# Patient Record
Sex: MALE | Race: WHITE | NOT HISPANIC OR LATINO | Employment: FULL TIME | URBAN - METROPOLITAN AREA
[De-identification: names, ages, dates, MRNs, and addresses within clinical notes are randomized per-mention and may not be internally consistent; named-entity substitution may affect disease eponyms.]

---

## 2019-12-16 ENCOUNTER — OFFICE VISIT (OUTPATIENT)
Dept: FAMILY MEDICINE | Facility: CLINIC | Age: 29
End: 2019-12-16
Payer: COMMERCIAL

## 2019-12-16 VITALS
BODY MASS INDEX: 37.24 KG/M2 | TEMPERATURE: 99 F | HEIGHT: 70 IN | SYSTOLIC BLOOD PRESSURE: 148 MMHG | WEIGHT: 260.13 LBS | HEART RATE: 86 BPM | DIASTOLIC BLOOD PRESSURE: 80 MMHG

## 2019-12-16 DIAGNOSIS — E78.5 DYSLIPIDEMIA: ICD-10-CM

## 2019-12-16 DIAGNOSIS — Z23 IMMUNIZATION DUE: ICD-10-CM

## 2019-12-16 DIAGNOSIS — Z00.00 GENERAL MEDICAL EXAM: ICD-10-CM

## 2019-12-16 DIAGNOSIS — R10.11 RUQ PAIN: ICD-10-CM

## 2019-12-16 DIAGNOSIS — G62.9 POLYNEUROPATHY: Primary | ICD-10-CM

## 2019-12-16 DIAGNOSIS — R94.31 ABNORMAL ECG: ICD-10-CM

## 2019-12-16 DIAGNOSIS — R07.89 CHEST DISCOMFORT: ICD-10-CM

## 2019-12-16 PROCEDURE — 99999 PR PBB SHADOW E&M-EST. PATIENT-LVL III: ICD-10-PCS | Mod: PBBFAC,,, | Performed by: FAMILY MEDICINE

## 2019-12-16 PROCEDURE — 93010 ELECTROCARDIOGRAM REPORT: CPT | Mod: S$GLB,,, | Performed by: INTERNAL MEDICINE

## 2019-12-16 PROCEDURE — 90715 TDAP VACCINE GREATER THAN OR EQUAL TO 7YO IM: ICD-10-PCS | Mod: S$GLB,,, | Performed by: FAMILY MEDICINE

## 2019-12-16 PROCEDURE — 90472 TDAP VACCINE GREATER THAN OR EQUAL TO 7YO IM: ICD-10-PCS | Mod: S$GLB,,, | Performed by: FAMILY MEDICINE

## 2019-12-16 PROCEDURE — 99999 PR PBB SHADOW E&M-EST. PATIENT-LVL III: CPT | Mod: PBBFAC,,, | Performed by: FAMILY MEDICINE

## 2019-12-16 PROCEDURE — 90715 TDAP VACCINE 7 YRS/> IM: CPT | Mod: S$GLB,,, | Performed by: FAMILY MEDICINE

## 2019-12-16 PROCEDURE — 90686 FLU VACCINE (QUAD) GREATER THAN OR EQUAL TO 3YO PRESERVATIVE FREE IM: ICD-10-PCS | Mod: S$GLB,,, | Performed by: FAMILY MEDICINE

## 2019-12-16 PROCEDURE — 90471 FLU VACCINE (QUAD) GREATER THAN OR EQUAL TO 3YO PRESERVATIVE FREE IM: ICD-10-PCS | Mod: S$GLB,,, | Performed by: FAMILY MEDICINE

## 2019-12-16 PROCEDURE — 90686 IIV4 VACC NO PRSV 0.5 ML IM: CPT | Mod: S$GLB,,, | Performed by: FAMILY MEDICINE

## 2019-12-16 PROCEDURE — 90472 IMMUNIZATION ADMIN EACH ADD: CPT | Mod: S$GLB,,, | Performed by: FAMILY MEDICINE

## 2019-12-16 PROCEDURE — 93005 ELECTROCARDIOGRAM TRACING: CPT | Mod: S$GLB,,, | Performed by: FAMILY MEDICINE

## 2019-12-16 PROCEDURE — 93005 EKG 12-LEAD: ICD-10-PCS | Mod: S$GLB,,, | Performed by: FAMILY MEDICINE

## 2019-12-16 PROCEDURE — 93010 EKG 12-LEAD: ICD-10-PCS | Mod: S$GLB,,, | Performed by: INTERNAL MEDICINE

## 2019-12-16 PROCEDURE — 99204 OFFICE O/P NEW MOD 45 MIN: CPT | Mod: 25,S$GLB,, | Performed by: FAMILY MEDICINE

## 2019-12-16 PROCEDURE — 90471 IMMUNIZATION ADMIN: CPT | Mod: S$GLB,,, | Performed by: FAMILY MEDICINE

## 2019-12-16 PROCEDURE — 99204 PR OFFICE/OUTPT VISIT, NEW, LEVL IV, 45-59 MIN: ICD-10-PCS | Mod: 25,S$GLB,, | Performed by: FAMILY MEDICINE

## 2019-12-16 RX ORDER — PANTOPRAZOLE SODIUM 40 MG/1
40 TABLET, DELAYED RELEASE ORAL DAILY
Qty: 30 TABLET | Refills: 11 | Status: SHIPPED | OUTPATIENT
Start: 2019-12-16 | End: 2019-12-19 | Stop reason: SDUPTHER

## 2019-12-16 NOTE — PROGRESS NOTES
THIS DOCUMENT WAS MADE IN PART WITH VOICE RECOGNITION SOFTWARE.  OCCASIONALLY THIS SOFTWARE WILL MISINTERPRET WORDS OR PHRASES.    Assessment and Plan:    1. Polyneuropathy  Will order broad lab work to try and scar in on cause of neuropathy  - Hemoglobin A1c; Future  - TSH; Future  - T4, free; Future  - Magnesium; Future  - Vitamin B12; Future  - Sedimentation rate; Future  - C-reactive protein; Future  - MAX Screen w/Reflex; Future    2. General medical exam  - CBC auto differential; Future  - Comprehensive metabolic panel; Future    3. Dyslipidemia  - Lipid panel; Future    4. Immunization due  Influenza and tetanus vaccine today  - (In Office Administered) Tdap Vaccine    5. RUQ pain  Check ultrasound, H pylori to evaluate for cause, will try proton pump inhibitor in the meantime for possible peptic ulcer disease versus gastritis  - H.Pylori Antibody IgG; Future  - pantoprazole (PROTONIX) 40 MG tablet; Take 1 tablet (40 mg total) by mouth once daily.  Dispense: 30 tablet; Refill: 11  - US Abdomen Limited; Future    6. Chest discomfort  EKG suggestive of possible history of infarct, low suspicion but will order echocardiogram to rule out  - EKG 12-lead-possible inferior infarct per computer read, patient does have flipped T-waves in lead 3 and AVF.  - Stress Echo Which stress agent will be used? Treadmill Exercise; Color Flow Doppler? No; Future    7. Abnormal ECG  - Stress Echo Which stress agent will be used? Treadmill Exercise; Color Flow Doppler? No; Future    8.  History of alcoholism  Patient is sober for 6 months, will let me know if he needs medical assistance    ______________________________________________________________________  Subjective:    Chief Complaint:  Chief Complaint   Patient presents with    Diabetes     pt is concerned he may be diabetic. pt c/o tingling in both hands and feet and dry mouth        HPI:  Michael is a 29 y.o. year old       H/o alcohol use, heavy  Quit about 6 months  ago.  Both he and his dad have history of heavy drinking.  Reports he was drinking approximately 1/2 pt liquor a night.  Has history of DUI.  Reports he is doing well in regards to cravings    RUQ pain   Duration 12 months.  Patient concerned about liver function due to his history of drinking.  Does not correlate food intake with pain. No change in color of stool.  Denies any fever unexplained weight loss.  Denies any hematemesis or hematuria.    Occasional chest discomfort  Can occur at rest or with physical activity.  Has been occurring for the last several weeks.  No family history of cardiovascular disease, no history of smoking.  Episodes last for several minutes at a time.    Neuropathy with dry mouth  Associated with increased thirst and polyuria.  Previous to concerned about possible diagnosis of diabetes.  He reports neuropathy episodes involved both hands and feet and only lasted for short period of time.  He reports no recent episodes.      Past Medical History:  Past Medical History:   Diagnosis Date    Asthma     childhood       Past Surgical History:  History reviewed. No pertinent surgical history.    Family History:  Family History   Problem Relation Age of Onset    No Known Problems Mother     No Known Problems Father     No Known Problems Brother        Social History:  Social History     Socioeconomic History    Marital status: Single     Spouse name: Not on file    Number of children: Not on file    Years of education: Not on file    Highest education level: Not on file   Occupational History    Not on file   Social Needs    Financial resource strain: Not on file    Food insecurity:     Worry: Not on file     Inability: Not on file    Transportation needs:     Medical: Not on file     Non-medical: Not on file   Tobacco Use    Smoking status: Never Smoker    Smokeless tobacco: Never Used   Substance and Sexual Activity    Alcohol use: Not Currently     Comment: heavy use for about  "4-5 (1/2 pint per day)    Drug use: No    Sexual activity: Yes     Partners: Female   Lifestyle    Physical activity:     Days per week: Not on file     Minutes per session: Not on file    Stress: Not on file   Relationships    Social connections:     Talks on phone: Not on file     Gets together: Not on file     Attends Muslim service: Not on file     Active member of club or organization: Not on file     Attends meetings of clubs or organizations: Not on file     Relationship status: Not on file   Other Topics Concern    Not on file   Social History Narrative    Not on file       Medications:  No current outpatient medications on file prior to visit.     No current facility-administered medications on file prior to visit.        Allergies:  Patient has no known allergies.    Immunizations:  Immunization History   Administered Date(s) Administered    Influenza - Quadrivalent - PF (6 months and older) 12/16/2019    Tdap 12/16/2019       Review of Systems:  Review of Systems   Constitutional: Negative for fever.   Respiratory: Negative for cough and shortness of breath.    Cardiovascular: Positive for chest pain.   Gastrointestinal: Positive for abdominal pain. Negative for diarrhea, nausea and vomiting.   Skin: Negative for rash.   Neurological: Positive for numbness.   Psychiatric/Behavioral: Negative for dysphoric mood.       Objective:    Vitals:  Vitals:    12/16/19 1007   BP: (!) 148/80   Pulse: 86   Temp: 98.7 °F (37.1 °C)   TempSrc: Oral   Weight: 118 kg (260 lb 2.3 oz)   Height: 5' 10" (1.778 m)   PainSc: 0-No pain       Physical Exam   Constitutional: He is oriented to person, place, and time. No distress.   HENT:   Head: Normocephalic and atraumatic.   Eyes: Pupils are equal, round, and reactive to light. EOM are normal.   Neck: Neck supple.   Cardiovascular: Normal rate and regular rhythm. Exam reveals no friction rub.   No murmur heard.  Pulmonary/Chest: Effort normal and breath sounds " normal.   Abdominal: Soft. Bowel sounds are normal. He exhibits no distension. There is no tenderness.   Neurological: He is alert and oriented to person, place, and time. He has normal strength. No cranial nerve deficit or sensory deficit. GCS eye subscore is 4. GCS verbal subscore is 5. GCS motor subscore is 6.   Reflex Scores:       Tricep reflexes are 2+ on the right side and 2+ on the left side.       Bicep reflexes are 2+ on the right side and 2+ on the left side.       Brachioradialis reflexes are 2+ on the right side and 2+ on the left side.       Patellar reflexes are 2+ on the right side and 2+ on the left side.       Achilles reflexes are 2+ on the right side and 2+ on the left side.  Skin: Skin is warm and dry. No rash noted.   Psychiatric: He has a normal mood and affect. His behavior is normal.       Data:  No previous labs, imaging, or notes available.        Kushal Porter MD  Family Medicine

## 2019-12-17 ENCOUNTER — LAB VISIT (OUTPATIENT)
Dept: LAB | Facility: HOSPITAL | Age: 29
End: 2019-12-17
Attending: FAMILY MEDICINE
Payer: COMMERCIAL

## 2019-12-17 ENCOUNTER — TELEPHONE (OUTPATIENT)
Dept: FAMILY MEDICINE | Facility: CLINIC | Age: 29
End: 2019-12-17

## 2019-12-17 DIAGNOSIS — G62.9 POLYNEUROPATHY: ICD-10-CM

## 2019-12-17 DIAGNOSIS — R10.11 RUQ PAIN: ICD-10-CM

## 2019-12-17 DIAGNOSIS — Z00.00 GENERAL MEDICAL EXAM: ICD-10-CM

## 2019-12-17 DIAGNOSIS — E78.5 DYSLIPIDEMIA: ICD-10-CM

## 2019-12-17 LAB
ALBUMIN SERPL BCP-MCNC: 4.5 G/DL (ref 3.5–5.2)
ALP SERPL-CCNC: 62 U/L (ref 55–135)
ALT SERPL W/O P-5'-P-CCNC: 31 U/L (ref 10–44)
ANION GAP SERPL CALC-SCNC: 8 MMOL/L (ref 8–16)
AST SERPL-CCNC: 21 U/L (ref 10–40)
BASOPHILS # BLD AUTO: 0.03 K/UL (ref 0–0.2)
BASOPHILS NFR BLD: 0.4 % (ref 0–1.9)
BILIRUB SERPL-MCNC: 0.7 MG/DL (ref 0.1–1)
BUN SERPL-MCNC: 12 MG/DL (ref 6–20)
CALCIUM SERPL-MCNC: 9.8 MG/DL (ref 8.7–10.5)
CHLORIDE SERPL-SCNC: 105 MMOL/L (ref 95–110)
CHOLEST SERPL-MCNC: 254 MG/DL (ref 120–199)
CHOLEST/HDLC SERPL: 5.5 {RATIO} (ref 2–5)
CO2 SERPL-SCNC: 26 MMOL/L (ref 23–29)
CREAT SERPL-MCNC: 0.9 MG/DL (ref 0.5–1.4)
CRP SERPL-MCNC: 6.8 MG/L (ref 0–8.2)
DIFFERENTIAL METHOD: ABNORMAL
EOSINOPHIL # BLD AUTO: 0.1 K/UL (ref 0–0.5)
EOSINOPHIL NFR BLD: 1.4 % (ref 0–8)
ERYTHROCYTE [DISTWIDTH] IN BLOOD BY AUTOMATED COUNT: 12.4 % (ref 11.5–14.5)
ERYTHROCYTE [SEDIMENTATION RATE] IN BLOOD BY WESTERGREN METHOD: 16 MM/HR (ref 0–23)
EST. GFR  (AFRICAN AMERICAN): >60 ML/MIN/1.73 M^2
EST. GFR  (NON AFRICAN AMERICAN): >60 ML/MIN/1.73 M^2
ESTIMATED AVG GLUCOSE: 94 MG/DL (ref 68–131)
GLUCOSE SERPL-MCNC: 97 MG/DL (ref 70–110)
HBA1C MFR BLD HPLC: 4.9 % (ref 4–5.6)
HCT VFR BLD AUTO: 40.2 % (ref 40–54)
HDLC SERPL-MCNC: 46 MG/DL (ref 40–75)
HDLC SERPL: 18.1 % (ref 20–50)
HGB BLD-MCNC: 13 G/DL (ref 14–18)
IMM GRANULOCYTES # BLD AUTO: 0.01 K/UL (ref 0–0.04)
IMM GRANULOCYTES NFR BLD AUTO: 0.1 % (ref 0–0.5)
LDLC SERPL CALC-MCNC: 182.4 MG/DL (ref 63–159)
LYMPHOCYTES # BLD AUTO: 1.1 K/UL (ref 1–4.8)
LYMPHOCYTES NFR BLD: 13.3 % (ref 18–48)
MAGNESIUM SERPL-MCNC: 2 MG/DL (ref 1.6–2.6)
MCH RBC QN AUTO: 29.8 PG (ref 27–31)
MCHC RBC AUTO-ENTMCNC: 32.3 G/DL (ref 32–36)
MCV RBC AUTO: 92 FL (ref 82–98)
MONOCYTES # BLD AUTO: 0.8 K/UL (ref 0.3–1)
MONOCYTES NFR BLD: 9.5 % (ref 4–15)
NEUTROPHILS # BLD AUTO: 6.3 K/UL (ref 1.8–7.7)
NEUTROPHILS NFR BLD: 75.3 % (ref 38–73)
NONHDLC SERPL-MCNC: 208 MG/DL
NRBC BLD-RTO: 0 /100 WBC
PLATELET # BLD AUTO: 259 K/UL (ref 150–350)
PMV BLD AUTO: 10.6 FL (ref 9.2–12.9)
POTASSIUM SERPL-SCNC: 4.2 MMOL/L (ref 3.5–5.1)
PROT SERPL-MCNC: 8.1 G/DL (ref 6–8.4)
RBC # BLD AUTO: 4.36 M/UL (ref 4.6–6.2)
SODIUM SERPL-SCNC: 139 MMOL/L (ref 136–145)
T4 FREE SERPL-MCNC: 1 NG/DL (ref 0.71–1.51)
TRIGL SERPL-MCNC: 128 MG/DL (ref 30–150)
TSH SERPL DL<=0.005 MIU/L-ACNC: 1.23 UIU/ML (ref 0.4–4)
VIT B12 SERPL-MCNC: 430 PG/ML (ref 210–950)
WBC # BLD AUTO: 8.32 K/UL (ref 3.9–12.7)

## 2019-12-17 PROCEDURE — 84443 ASSAY THYROID STIM HORMONE: CPT

## 2019-12-17 PROCEDURE — 84439 ASSAY OF FREE THYROXINE: CPT

## 2019-12-17 PROCEDURE — 83036 HEMOGLOBIN GLYCOSYLATED A1C: CPT

## 2019-12-17 PROCEDURE — 85652 RBC SED RATE AUTOMATED: CPT

## 2019-12-17 PROCEDURE — 80061 LIPID PANEL: CPT

## 2019-12-17 PROCEDURE — 86038 ANTINUCLEAR ANTIBODIES: CPT

## 2019-12-17 PROCEDURE — 36415 COLL VENOUS BLD VENIPUNCTURE: CPT | Mod: PN

## 2019-12-17 PROCEDURE — 85025 COMPLETE CBC W/AUTO DIFF WBC: CPT

## 2019-12-17 PROCEDURE — 86677 HELICOBACTER PYLORI ANTIBODY: CPT

## 2019-12-17 PROCEDURE — 82607 VITAMIN B-12: CPT

## 2019-12-17 PROCEDURE — 83735 ASSAY OF MAGNESIUM: CPT

## 2019-12-17 PROCEDURE — 80053 COMPREHEN METABOLIC PANEL: CPT

## 2019-12-17 PROCEDURE — 86140 C-REACTIVE PROTEIN: CPT

## 2019-12-17 NOTE — TELEPHONE ENCOUNTER
----- Message from Kirsten Stevenson sent at 12/17/2019 11:42 AM CST -----  Contact: Patient  Type: Needs Medical Advice    Who Called:  Patient  Best Call Back Number:   Additional Information: Calling to request the Nurse call his Insurance company at  in regards to pantoprazole (PROTONIX) 40 MG tablet.

## 2019-12-18 ENCOUNTER — HOSPITAL ENCOUNTER (OUTPATIENT)
Dept: RADIOLOGY | Facility: HOSPITAL | Age: 29
Discharge: HOME | End: 2019-12-18
Attending: FAMILY MEDICINE
Payer: COMMERCIAL

## 2019-12-18 DIAGNOSIS — R10.11 RUQ PAIN: ICD-10-CM

## 2019-12-18 LAB — H PYLORI IGG SERPL QL IA: NEGATIVE

## 2019-12-18 PROCEDURE — 76705 ECHO EXAM OF ABDOMEN: CPT | Mod: TC,PO

## 2019-12-18 PROCEDURE — 76705 US ABDOMEN LIMITED: ICD-10-PCS | Mod: 26,,, | Performed by: RADIOLOGY

## 2019-12-18 PROCEDURE — 76705 ECHO EXAM OF ABDOMEN: CPT | Mod: 26,,, | Performed by: RADIOLOGY

## 2019-12-19 DIAGNOSIS — R10.11 RUQ PAIN: ICD-10-CM

## 2019-12-19 LAB — ANA SER QL IF: NORMAL

## 2019-12-19 NOTE — TELEPHONE ENCOUNTER
----- Message from Damien Bhagat sent at 12/19/2019 10:49 AM CST -----  Contact: pt  Type:  Pharmacy Calling to Clarify an RX    Name of Caller:  pt  Pharmacy Name:    [facility]  Phone: [phone]    Prescription Name:  pantoprazole (PROTONIX) 40 MG tablet  What do they need to clarify?:  Sent to wrong pharmacy. (local)  Best Call Back Number:  [phone]  Additional Information:  Pt is requesting the Rx be sent to the above pharmacy.

## 2019-12-20 DIAGNOSIS — E78.5 DYSLIPIDEMIA: Primary | ICD-10-CM

## 2019-12-20 RX ORDER — ROSUVASTATIN CALCIUM 20 MG/1
20 TABLET, COATED ORAL DAILY
Qty: 90 TABLET | Refills: 3 | Status: SHIPPED | OUTPATIENT
Start: 2019-12-20 | End: 2020-11-09 | Stop reason: SDUPTHER

## 2019-12-20 RX ORDER — PANTOPRAZOLE SODIUM 40 MG/1
40 TABLET, DELAYED RELEASE ORAL DAILY
Qty: 90 TABLET | Refills: 3 | Status: SHIPPED | OUTPATIENT
Start: 2019-12-20 | End: 2020-11-09 | Stop reason: SDUPTHER

## 2020-01-02 ENCOUNTER — CLINICAL SUPPORT (OUTPATIENT)
Dept: CARDIOLOGY | Facility: CLINIC | Age: 30
End: 2020-01-02
Attending: FAMILY MEDICINE
Payer: COMMERCIAL

## 2020-01-02 ENCOUNTER — OFFICE VISIT (OUTPATIENT)
Dept: FAMILY MEDICINE | Facility: CLINIC | Age: 30
End: 2020-01-02
Payer: COMMERCIAL

## 2020-01-02 ENCOUNTER — TELEPHONE (OUTPATIENT)
Dept: FAMILY MEDICINE | Facility: CLINIC | Age: 30
End: 2020-01-02

## 2020-01-02 VITALS
SYSTOLIC BLOOD PRESSURE: 130 MMHG | HEART RATE: 97 BPM | WEIGHT: 257.69 LBS | DIASTOLIC BLOOD PRESSURE: 88 MMHG | BODY MASS INDEX: 36.89 KG/M2 | OXYGEN SATURATION: 97 % | HEIGHT: 70 IN | TEMPERATURE: 98 F

## 2020-01-02 VITALS — WEIGHT: 257 LBS | BODY MASS INDEX: 36.79 KG/M2 | HEIGHT: 70 IN

## 2020-01-02 DIAGNOSIS — R94.31 ABNORMAL ECG: ICD-10-CM

## 2020-01-02 DIAGNOSIS — E78.5 DYSLIPIDEMIA: Primary | ICD-10-CM

## 2020-01-02 DIAGNOSIS — R07.89 CHEST DISCOMFORT: ICD-10-CM

## 2020-01-02 DIAGNOSIS — R63.1 POLYDIPSIA: Primary | ICD-10-CM

## 2020-01-02 DIAGNOSIS — R10.11 RUQ PAIN: ICD-10-CM

## 2020-01-02 DIAGNOSIS — R63.1 POLYDIPSIA: ICD-10-CM

## 2020-01-02 LAB
ASCENDING AORTA: 2.47 CM
BSA FOR ECHO PROCEDURE: 2.4 M2
CV ECHO LV RWT: 0.44 CM
CV STRESS BASE HR: 93 BPM
DIASTOLIC BLOOD PRESSURE: 93 MMHG
DOP CALC LVOT AREA: 3.3 CM2
DOP CALC LVOT DIAMETER: 2.05 CM
DOP CALC LVOT PEAK VEL: 1.02 M/S
DOP CALC LVOT STROKE VOLUME: 59.32 CM3
DOP CALCLVOT PEAK VEL VTI: 17.98 CM
E WAVE DECELERATION TIME: 212.93 MSEC
E/A RATIO: 1.29
E/E' RATIO: 6.63 M/S
ECHO LV POSTERIOR WALL: 1.07 CM (ref 0.6–1.1)
FRACTIONAL SHORTENING: 35 % (ref 28–44)
INTERVENTRICULAR SEPTUM: 1.06 CM (ref 0.6–1.1)
IVRT: 0.1 MSEC
LA MAJOR: 4.59 CM
LA MINOR: 4.49 CM
LA WIDTH: 2.49 CM
LEFT ATRIUM SIZE: 3.56 CM
LEFT ATRIUM VOLUME INDEX: 14.7 ML/M2
LEFT ATRIUM VOLUME: 34.2 CM3
LEFT INTERNAL DIMENSION IN SYSTOLE: 3.17 CM (ref 2.1–4)
LEFT VENTRICLE DIASTOLIC VOLUME INDEX: 47.41 ML/M2
LEFT VENTRICLE DIASTOLIC VOLUME: 110.09 ML
LEFT VENTRICLE MASS INDEX: 81 G/M2
LEFT VENTRICLE SYSTOLIC VOLUME INDEX: 17.3 ML/M2
LEFT VENTRICLE SYSTOLIC VOLUME: 40.11 ML
LEFT VENTRICULAR INTERNAL DIMENSION IN DIASTOLE: 4.85 CM (ref 3.5–6)
LEFT VENTRICULAR MASS: 188.62 G
LV LATERAL E/E' RATIO: 5.73 M/S
LV SEPTAL E/E' RATIO: 7.88 M/S
MV PEAK A VEL: 0.49 M/S
MV PEAK E VEL: 0.63 M/S
OHS CV CPX 1 MINUTE RECOVERY HEART RATE: 144 BPM
OHS CV CPX 85 PERCENT MAX PREDICTED HEART RATE MALE: 161
OHS CV CPX ESTIMATED METS: 9
OHS CV CPX MAX PREDICTED HEART RATE: 189
OHS CV CPX PATIENT IS FEMALE: 0
OHS CV CPX PATIENT IS MALE: 1
OHS CV CPX PEAK DIASTOLIC BLOOD PRESSURE: 65 MMHG
OHS CV CPX PEAK HEAR RATE: 179 BPM
OHS CV CPX PEAK RATE PRESSURE PRODUCT: NORMAL
OHS CV CPX PEAK SYSTOLIC BLOOD PRESSURE: 196 MMHG
OHS CV CPX PERCENT MAX PREDICTED HEART RATE ACHIEVED: 95
OHS CV CPX RATE PRESSURE PRODUCT PRESENTING: NORMAL
PULM VEIN S/D RATIO: 1.19
PV PEAK D VEL: 0.67 M/S
PV PEAK S VEL: 0.8 M/S
RA MAJOR: 4.62 CM
RA PRESSURE: 3 MMHG
RA WIDTH: 2.48 CM
SINUS: 2.93 CM
STJ: 2.46 CM
STRESS ECHO POST EXERCISE DUR MIN: 5 MINUTES
STRESS ECHO POST EXERCISE DUR SEC: 8 SECONDS
SYSTOLIC BLOOD PRESSURE: 150 MMHG
TDI LATERAL: 0.11 M/S
TDI SEPTAL: 0.08 M/S
TDI: 0.1 M/S

## 2020-01-02 PROCEDURE — 3008F PR BODY MASS INDEX (BMI) DOCUMENTED: ICD-10-PCS | Mod: CPTII,S$GLB,, | Performed by: FAMILY MEDICINE

## 2020-01-02 PROCEDURE — 99999 PR PBB SHADOW E&M-EST. PATIENT-LVL I: ICD-10-PCS | Mod: PBBFAC,,,

## 2020-01-02 PROCEDURE — 93351 STRESS ECHO (CUPID ONLY): ICD-10-PCS | Mod: S$GLB,,, | Performed by: INTERNAL MEDICINE

## 2020-01-02 PROCEDURE — 99999 PR PBB SHADOW E&M-EST. PATIENT-LVL I: CPT | Mod: PBBFAC,,,

## 2020-01-02 PROCEDURE — 99999 PR PBB SHADOW E&M-EST. PATIENT-LVL III: ICD-10-PCS | Mod: PBBFAC,,, | Performed by: FAMILY MEDICINE

## 2020-01-02 PROCEDURE — 3008F BODY MASS INDEX DOCD: CPT | Mod: CPTII,S$GLB,, | Performed by: FAMILY MEDICINE

## 2020-01-02 PROCEDURE — 99214 PR OFFICE/OUTPT VISIT, EST, LEVL IV, 30-39 MIN: ICD-10-PCS | Mod: S$GLB,,, | Performed by: FAMILY MEDICINE

## 2020-01-02 PROCEDURE — 99214 OFFICE O/P EST MOD 30 MIN: CPT | Mod: S$GLB,,, | Performed by: FAMILY MEDICINE

## 2020-01-02 PROCEDURE — 93351 STRESS TTE COMPLETE: CPT | Mod: S$GLB,,, | Performed by: INTERNAL MEDICINE

## 2020-01-02 PROCEDURE — 99999 PR PBB SHADOW E&M-EST. PATIENT-LVL III: CPT | Mod: PBBFAC,,, | Performed by: FAMILY MEDICINE

## 2020-01-02 RX ORDER — DULOXETIN HYDROCHLORIDE 30 MG/1
CAPSULE, DELAYED RELEASE ORAL
COMMUNITY
Start: 2019-12-19

## 2020-01-02 NOTE — TELEPHONE ENCOUNTER
----- Message from Sheba Smiley sent at 1/2/2020  2:34 PM CST -----  Contact: Patient  Patient called to schedule appt from referral....was unable to schedule anything b/c it says [facility] has a clinic specifically for this issue?    Please either put in differently if he needs to see [facility] specifically or let patient know where to be seen?    Please call patient at [phone]

## 2020-01-02 NOTE — PROGRESS NOTES
THIS DOCUMENT WAS MADE IN PART WITH VOICE RECOGNITION SOFTWARE.  OCCASIONALLY THIS SOFTWARE WILL MISINTERPRET WORDS OR PHRASES.    Assessment and Plan:    1. Dyslipidemia  Continue Crestor, follow-up blood work in about 3 months    2. RUQ pain  Initiate pantoprazole, normal right upper quadrant ultrasound    3. Chest discomfort  Stress test today    4. Polydipsia  Normal A1c, referral to Endocrinology for possible diabetes insipidus workup versus other pathology  - Ambulatory referral/consult to Endocrinology; Future        ______________________________________________________________________  Subjective:    Chief Complaint:  Chief Complaint   Patient presents with    Polydipsia    Dyslipidemia    Chest Pain        HPI:  Michael is a 29 y.o. year old     Exertional chest discomfort  EKG with possible inferior infarct at previous visit.  We did order stress echo which is scheduled for today.  Has occasional chest discomfort.    Dyslipidemia  Patient had 182 LDL, 254 total cholesterol at previous lab check on December 17 2019.  Initiated Crestor 20 mg therapy, patient has yet to fill prescription.    Right upper quadrant discomfort  Negative H pylori.  Started pantoprazole at previous visit.  Ultrasound revealed no significant abnormality.  Has yet to initiate pantoprazole therapy due to a mixup with pharmacy.    Polydipsia  Patient still reports having extreme thirst.  Drinking approximately 2 gal of water daily.  A1c was 4.9%.  Normal sodium serum    Past Medical History:  Past Medical History:   Diagnosis Date    Asthma     childhood       Past Surgical History:  No past surgical history on file.    Family History:  Family History   Problem Relation Age of Onset    No Known Problems Mother     No Known Problems Father     No Known Problems Brother        Social History:  Social History     Socioeconomic History    Marital status: Single     Spouse name: Not on file    Number of children: Not on file     Years of education: Not on file    Highest education level: Not on file   Occupational History    Not on file   Social Needs    Financial resource strain: Not on file    Food insecurity:     Worry: Not on file     Inability: Not on file    Transportation needs:     Medical: Not on file     Non-medical: Not on file   Tobacco Use    Smoking status: Never Smoker    Smokeless tobacco: Never Used   Substance and Sexual Activity    Alcohol use: Not Currently     Comment: heavy use for about 4-5 (1/2 pint per day)    Drug use: No    Sexual activity: Yes     Partners: Female   Lifestyle    Physical activity:     Days per week: Not on file     Minutes per session: Not on file    Stress: Not on file   Relationships    Social connections:     Talks on phone: Not on file     Gets together: Not on file     Attends Mormonism service: Not on file     Active member of club or organization: Not on file     Attends meetings of clubs or organizations: Not on file     Relationship status: Not on file   Other Topics Concern    Not on file   Social History Narrative    Not on file       Medications:  Current Outpatient Medications on File Prior to Visit   Medication Sig Dispense Refill    DULoxetine (CYMBALTA) 30 MG capsule TK ONE C PO  QAM      pantoprazole (PROTONIX) 40 MG tablet Take 1 tablet (40 mg total) by mouth once daily. (Patient not taking: Reported on 1/2/2020) 90 tablet 3    rosuvastatin (CRESTOR) 20 MG tablet Take 1 tablet (20 mg total) by mouth once daily. (Patient not taking: Reported on 1/2/2020) 90 tablet 3     No current facility-administered medications on file prior to visit.        Allergies:  Patient has no known allergies.    Immunizations:  Immunization History   Administered Date(s) Administered    Influenza - Quadrivalent - PF (6 months and older) 12/16/2019    Tdap 12/16/2019       Review of Systems:  Review of Systems   Constitutional: Negative for fever.   Respiratory: Negative for cough  "and shortness of breath.    Cardiovascular: Negative for chest pain.   Gastrointestinal: Negative for abdominal pain, diarrhea, nausea and vomiting.   Endocrine: Positive for polydipsia.   Skin: Negative for rash.   Psychiatric/Behavioral: Negative for dysphoric mood.       Objective:    Vitals:  Vitals:    01/02/20 0931   BP: 130/88   Pulse: 97   Temp: 97.9 °F (36.6 °C)   TempSrc: Oral   SpO2: 97%   Weight: 116.9 kg (257 lb 11.5 oz)   Height: 5' 10" (1.778 m)   PainSc: 0-No pain       Physical Exam   Constitutional: No distress.   HENT:   Head: Normocephalic and atraumatic.   Eyes: Pupils are equal, round, and reactive to light. EOM are normal.   Neck: Neck supple.   Cardiovascular: Normal rate and regular rhythm. Exam reveals no friction rub.   No murmur heard.  Pulmonary/Chest: Effort normal and breath sounds normal.   Abdominal: Soft. Bowel sounds are normal. He exhibits no distension. There is no tenderness.   Skin: Skin is warm and dry. No rash noted.        Psychiatric: He has a normal mood and affect. His behavior is normal.       Data:  No previous labs, imaging, or notes available.        Kushal Porter MD  Family Medicine    "

## 2020-01-09 ENCOUNTER — OFFICE VISIT (OUTPATIENT)
Dept: ENDOCRINOLOGY | Facility: CLINIC | Age: 30
End: 2020-01-09
Payer: COMMERCIAL

## 2020-01-09 ENCOUNTER — LAB VISIT (OUTPATIENT)
Dept: LAB | Facility: HOSPITAL | Age: 30
End: 2020-01-09
Attending: INTERNAL MEDICINE
Payer: COMMERCIAL

## 2020-01-09 VITALS
HEIGHT: 70 IN | HEART RATE: 105 BPM | OXYGEN SATURATION: 98 % | DIASTOLIC BLOOD PRESSURE: 90 MMHG | BODY MASS INDEX: 37.5 KG/M2 | SYSTOLIC BLOOD PRESSURE: 142 MMHG | WEIGHT: 261.94 LBS

## 2020-01-09 DIAGNOSIS — R63.1 POLYDIPSIA: Primary | ICD-10-CM

## 2020-01-09 DIAGNOSIS — E66.9 CLASS 2 OBESITY WITH BODY MASS INDEX (BMI) OF 37.0 TO 37.9 IN ADULT, UNSPECIFIED OBESITY TYPE, UNSPECIFIED WHETHER SERIOUS COMORBIDITY PRESENT: ICD-10-CM

## 2020-01-09 DIAGNOSIS — E78.49 OTHER HYPERLIPIDEMIA: ICD-10-CM

## 2020-01-09 DIAGNOSIS — R35.89 POLYURIA: ICD-10-CM

## 2020-01-09 DIAGNOSIS — R63.1 POLYDIPSIA: ICD-10-CM

## 2020-01-09 LAB
ALBUMIN SERPL BCP-MCNC: 4.2 G/DL (ref 3.5–5.2)
ALP SERPL-CCNC: 55 U/L (ref 55–135)
ALT SERPL W/O P-5'-P-CCNC: 55 U/L (ref 10–44)
ANION GAP SERPL CALC-SCNC: 7 MMOL/L (ref 8–16)
AST SERPL-CCNC: 50 U/L (ref 10–40)
BILIRUB SERPL-MCNC: 0.5 MG/DL (ref 0.1–1)
BUN SERPL-MCNC: 9 MG/DL (ref 6–20)
CALCIUM SERPL-MCNC: 9.3 MG/DL (ref 8.7–10.5)
CHLORIDE SERPL-SCNC: 108 MMOL/L (ref 95–110)
CO2 SERPL-SCNC: 27 MMOL/L (ref 23–29)
CREAT SERPL-MCNC: 1.1 MG/DL (ref 0.5–1.4)
EST. GFR  (AFRICAN AMERICAN): >60 ML/MIN/1.73 M^2
EST. GFR  (NON AFRICAN AMERICAN): >60 ML/MIN/1.73 M^2
GLUCOSE SERPL-MCNC: 109 MG/DL (ref 70–110)
OSMOLALITY SERPL: 295 MOSM/KG (ref 280–300)
POTASSIUM SERPL-SCNC: 4.6 MMOL/L (ref 3.5–5.1)
PROT SERPL-MCNC: 7.4 G/DL (ref 6–8.4)
SODIUM SERPL-SCNC: 142 MMOL/L (ref 136–145)
URATE SERPL-MCNC: 6.9 MG/DL (ref 3.4–7)

## 2020-01-09 PROCEDURE — 80053 COMPREHEN METABOLIC PANEL: CPT

## 2020-01-09 PROCEDURE — 3008F PR BODY MASS INDEX (BMI) DOCUMENTED: ICD-10-PCS | Mod: CPTII,S$GLB,, | Performed by: INTERNAL MEDICINE

## 2020-01-09 PROCEDURE — 99204 OFFICE O/P NEW MOD 45 MIN: CPT | Mod: S$GLB,,, | Performed by: INTERNAL MEDICINE

## 2020-01-09 PROCEDURE — 3008F BODY MASS INDEX DOCD: CPT | Mod: CPTII,S$GLB,, | Performed by: INTERNAL MEDICINE

## 2020-01-09 PROCEDURE — 36415 COLL VENOUS BLD VENIPUNCTURE: CPT | Mod: PO

## 2020-01-09 PROCEDURE — 99999 PR PBB SHADOW E&M-EST. PATIENT-LVL III: ICD-10-PCS | Mod: PBBFAC,,, | Performed by: INTERNAL MEDICINE

## 2020-01-09 PROCEDURE — 99204 PR OFFICE/OUTPT VISIT, NEW, LEVL IV, 45-59 MIN: ICD-10-PCS | Mod: S$GLB,,, | Performed by: INTERNAL MEDICINE

## 2020-01-09 PROCEDURE — 83930 ASSAY OF BLOOD OSMOLALITY: CPT

## 2020-01-09 PROCEDURE — 99999 PR PBB SHADOW E&M-EST. PATIENT-LVL III: CPT | Mod: PBBFAC,,, | Performed by: INTERNAL MEDICINE

## 2020-01-09 PROCEDURE — 84550 ASSAY OF BLOOD/URIC ACID: CPT

## 2020-01-09 NOTE — PROGRESS NOTES
Subjective:    Patient ID:  Michael Barba is a 29 y.o. male.    Chief Complaint:  Polydipsia      Pt presents to establish care for polydipsia.    Notes this has been worsening over the last 6 months. Can drink up to 2 gallons of water daily. Notes increased thirst. Drinks Malinda (carbonated water). Drinks 1-2 cups of coffee daily, thinks they are double shots. Denies headaches. No recent head trauma or car accident. Notes 40lb weight gain over the last year. Also notes polyuria. Uses bathroom about 15 times/day. Typically, he would use the rest room 3-4 times daily. Does not wake up at night to urinate. Has been on Cymbalta for about two weeks. No lithium use. Denies excess intake of protein or exogenous steroid use. Takes MVI with potassium in addition to other meds.       Review of Systems   Constitution: Positive for weight gain. Negative for decreased appetite, diaphoresis, malaise/fatigue, night sweats and weight loss.   HENT: Negative for hoarse voice, odynophagia and sore throat.    Eyes: Negative for blurred vision and double vision.   Cardiovascular: Positive for chest pain. Negative for dyspnea on exertion, irregular heartbeat, leg swelling, near-syncope and palpitations.        Had stress test that was wnls   Endocrine: Positive for polydipsia and polyuria. Negative for cold intolerance, heat intolerance and polyphagia.   Hematologic/Lymphatic: Negative for adenopathy. Does not bruise/bleed easily.   Skin: Negative for dry skin, nail changes and rash.   Musculoskeletal: Negative for falls, muscle cramps and myalgias.   Gastrointestinal: Positive for heartburn. Negative for abdominal pain, change in bowel habit, constipation, diarrhea, nausea and vomiting.   Genitourinary: Positive for frequency, incomplete emptying and urgency. Negative for bladder incontinence, dysuria, hematuria and nocturia.   Neurological: Positive for numbness. Negative for dizziness, headaches, light-headedness, paresthesias  and tremors.   Psychiatric/Behavioral: Positive for depression. The patient does not have insomnia and is not nervous/anxious.         Past Medical History:   Diagnosis Date    Asthma     childhood    Depression     Hyperlipidemia     Obesity       Social History     Tobacco Use    Smoking status: Never Smoker    Smokeless tobacco: Never Used   Substance Use Topics    Alcohol use: Not Currently     Comment: heavy use for about 4-5 (1/2 pint per day)    Drug use: No     Family History   Problem Relation Age of Onset    Hypoglycemic Mother     Hypertension Father     No Known Problems Brother     Stroke Maternal Grandmother     Heart attack Maternal Grandfather       History reviewed. No pertinent surgical history.       Current Outpatient Medications:     DULoxetine (CYMBALTA) 30 MG capsule, TK ONE C PO  QAM, Disp: , Rfl:     pantoprazole (PROTONIX) 40 MG tablet, Take 1 tablet (40 mg total) by mouth once daily., Disp: 90 tablet, Rfl: 3    rosuvastatin (CRESTOR) 20 MG tablet, Take 1 tablet (20 mg total) by mouth once daily., Disp: 90 tablet, Rfl: 3     Review of patient's allergies indicates:  No Known Allergies     Objective:     Vitals:    01/09/20 0852   BP: (!) 142/90   Pulse: 105        Physical Exam   Constitutional: He is oriented to person, place, and time. He appears well-developed and well-nourished. No distress.   HENT:   Head: Normocephalic and atraumatic.   Eyes: Conjunctivae are normal. No scleral icterus.   Neck: Neck supple.   Difficult to decipher if mild thyromegaly vs prominent bilat SCM. No buffalo hump   Cardiovascular: Normal rate, regular rhythm, normal heart sounds and intact distal pulses. Exam reveals no gallop and no friction rub.   No murmur heard.  Pulmonary/Chest: Effort normal and breath sounds normal. No respiratory distress. He has no wheezes. He has no rales.   Abdominal: Soft. Bowel sounds are normal. There is no tenderness.   Mild abd light purple striae <1.5 mm, abd  obese   Musculoskeletal: He exhibits no edema.   Lymphadenopathy:     He has no cervical adenopathy.   Neurological: He is alert and oriented to person, place, and time. He displays normal reflexes.   Skin: Skin is warm and dry. No rash noted. He is not diaphoretic.   Psychiatric: He has a normal mood and affect. Thought content normal.         Lab Results   Component Value Date     12/17/2019    K 4.2 12/17/2019     12/17/2019    CO2 26 12/17/2019    BUN 12 12/17/2019    CREATININE 0.9 12/17/2019    GLU 97 12/17/2019    HGBA1C 4.9 12/17/2019    MG 2.0 12/17/2019    AST 21 12/17/2019    ALT 31 12/17/2019    ALBUMIN 4.5 12/17/2019    PROT 8.1 12/17/2019    BILITOT 0.7 12/17/2019    WBC 8.32 12/17/2019    HGB 13.0 (L) 12/17/2019    HCT 40.2 12/17/2019    MCV 92 12/17/2019    MCH 29.8 12/17/2019     12/17/2019    MPV 10.6 12/17/2019    GRAN 6.3 12/17/2019    GRAN 75.3 (H) 12/17/2019    LYMPH 1.1 12/17/2019    LYMPH 13.3 (L) 12/17/2019    CHOL 254 (H) 12/17/2019    HDL 46 12/17/2019    LDLCALC 182.4 (H) 12/17/2019    TRIG 128 12/17/2019       Lab Results   Component Value Date    TSH 1.233 12/17/2019    FREET4 1.00 12/17/2019        Thyroid Labs Latest Ref Rng & Units 1/26/2016 12/17/2019   TSH 0.400 - 4.000 uIU/mL 0.657 1.233   Free T4 0.71 - 1.51 ng/dL - 1.00   Sodium 136 - 145 mmol/L 138 139   Potassium 3.5 - 5.1 mmol/L 4.0 4.2   Chloride 95 - 110 mmol/L 104 105   Carbon Dioxide 23 - 29 mmol/L 24 26   Glucose 70 - 110 mg/dL 90 97   Blood Urea Nitrogen 6 - 20 mg/dL 9 12   Creatinine 0.5 - 1.4 mg/dL 0.9 0.9   Calcium 8.7 - 10.5 mg/dL 9.3 9.8   Total Protein 6.0 - 8.4 g/dL 7.4 8.1   Albumin 3.5 - 5.2 g/dL 4.3 4.5   Total Bilirubin 0.1 - 1.0 mg/dL 0.6 0.7   AST 10 - 40 U/L 22 21   ALT 10 - 44 U/L 26 31   Anion Gap 8 - 16 mmol/L 10 8   eGFR (African American) >60 mL/min/1.73 m:2 >60.0 >60.0   eGFR (Non-African American) >60 mL/min/1.73 m:2 >60.0 >60.0   WBC 3.90 - 12.70 K/uL 8.55 8.32   RBC 4.60 - 6.20  M/uL 4.97 4.36(L)   Hemoglobin 14.0 - 18.0 g/dL 15.9 13.0(L)   Hematocrit 40.0 - 54.0 % 44.0 40.2   MCV 82 - 98 fL 89 92   MCH 27.0 - 31.0 pg 32.0(H) 29.8   MCHC 32.0 - 36.0 g/dL 36.1(H) 32.3   RDW 11.5 - 14.5 % 11.7 12.4   Platelets 150 - 350 K/uL 209 259   MPV 9.2 - 12.9 fL 10.6 10.6   Gran # 1.8 - 7.7 K/uL 6.1 6.3   Lymph # 1.0 - 4.8 K/uL 1.6 1.1   Mono # 0.3 - 1.0 K/uL 0.8 0.8   Eos # 0.0 - 0.5 K/uL 0.1 0.1   Baso # 0.00 - 0.20 K/uL 0.01 0.03   Gran % 38.0 - 73.0 % 71.4 75.3(H)   Lymph % 18.0 - 48.0 % 18.8 13.3(L)   Mono% 4.0 - 15.0 % 8.9 9.5   Eos % 0.0 - 8.0 % 0.6 1.4   Baso % 0.0 - 1.9 % 0.1 0.4           Hemoglobin A1C   Date Value Ref Range Status   12/17/2019 4.9 4.0 - 5.6 % Final     Comment:     ADA Screening Guidelines:  5.7-6.4%  Consistent with prediabetes  >or=6.5%  Consistent with diabetes  High levels of fetal hemoglobin interfere with the HbA1C  assay. Heterozygous hemoglobin variants (HbS, HgC, etc)do  not significantly interfere with this assay.   However, presence of multiple variants may affect accuracy.     01/26/2016 4.5 4.5 - 6.2 % Final         Assessment:       1. Polydipsia    2. Polyuria    3. Class 2 obesity with body mass index (BMI) of 37.0 to 37.9 in adult, unspecified obesity type, unspecified whether serious comorbidity present    4. Other hyperlipidemia         Plan:   Polydipsia, sub acute  Polyuria, sub acute  Ddx: psychogenic vs T2 DM (solute diuresis) vs DI. Denies excess protein intake or exogenous steroid use. Most recent calcium wnls. Will consider water deprivation test. F/u initial labs.  -     GLUCOSE TOLERANCE, 3 HOURS; Future; Expected date: 01/09/2020  -     OSMOLALITY, URINE RANDOM  -     Osmolality, Serum; Future; Expected date: 01/09/2020  -     Urinalysis  -     Sodium, urine, random  -     CREATININE, URINE, RANDOM  -     Uric acid; Future; Expected date: 01/09/2020  -     Uric Acid, Urine Random  -     Comprehensive metabolic panel; Future; Expected date:  01/09/2020  -     Urea nitrogen, urine    Class 2 obesity with body mass index (BMI) of 37.0 to 37.9 in adult, unspecified obesity type, unspecified whether serious comorbidity present  Encouraged pt to increase physical activity and start low carb/low fat diet.    Other hyperlipidemia, chronic, not at goal  Denies FH of hyperlipidemia or premature heart disese. LDL >170. Tolerating statin. Will need repeat FLP in 6-8 weeks. F/u with PCP. Atypical chest pain resolved. Had recent stress test that was wnls.    Follow up:  Labs today  Schedule glucose tolerance test  RTC in 4 months

## 2020-01-09 NOTE — LETTER
January 9, 2020      Kushal Porter MD  [address]  [facility] [address]           [facility]  [address] [place] [address]  Phone: [phone]  Fax: [phone]          Patient: Michael Barba   MR Number: [id number]   YOB: 1990   Date of Visit: 1/9/2020       Dear Dr. Kushal Porter:    Thank you for referring Michael Barba to me for evaluation. Attached you will find relevant portions of my assessment and plan of care.    If you have questions, please do not hesitate to call me. I look forward to following Michael Barba along with you.    Sincerely,    Ruby Whaley LPN    Enclosure  CC:  No Recipients    If you would like to receive this communication electronically, please contact [email or web address] or [phone] to request more information on [employer] access.    For providers and/or their staff who would like to refer a patient to [facility], please contact us through our one-stop-shop provider referral line, [facility], at [phone].    If you feel you have received this communication in error or would no longer like to receive these types of communications, please e-mail [email or web address]

## 2020-01-09 NOTE — PATIENT INSTRUCTIONS
Schedule glucose tolerance test. Remember to fast after 8pm prior to test.  Please keep symptom diary  Reduce water intake less to 2L daily  4 month f/u  Labs today

## 2020-01-27 ENCOUNTER — TELEPHONE (OUTPATIENT)
Dept: ENDOCRINOLOGY | Facility: CLINIC | Age: 30
End: 2020-01-27

## 2020-02-03 ENCOUNTER — TELEPHONE (OUTPATIENT)
Dept: ENDOCRINOLOGY | Facility: CLINIC | Age: 30
End: 2020-02-03

## 2020-02-03 DIAGNOSIS — R63.1 POLYDIPSIA: Primary | ICD-10-CM

## 2020-02-03 NOTE — TELEPHONE ENCOUNTER
Please inform the patient that lab results were reviewed. Need more information. Would like to do 24 hour urine test which will help me to interpret the results. Needs to repeat blood work the day he drops off the urine. Something else to consider is that he is on Cymbalta that could be causing dry mouth and stimulating thirst. Please mail or have pt  urine jugs and discuss how to properly do the test.    Also, please add 24 hour urine glucose if possible. May need to do add on. I could not locate this.

## 2020-02-06 ENCOUNTER — TELEPHONE (OUTPATIENT)
Dept: FAMILY MEDICINE | Facility: CLINIC | Age: 30
End: 2020-02-06

## 2020-02-10 ENCOUNTER — TELEPHONE (OUTPATIENT)
Dept: ENDOCRINOLOGY | Facility: CLINIC | Age: 30
End: 2020-02-10

## 2020-02-10 ENCOUNTER — LAB VISIT (OUTPATIENT)
Dept: LAB | Facility: HOSPITAL | Age: 30
End: 2020-02-10
Attending: INTERNAL MEDICINE
Payer: COMMERCIAL

## 2020-02-10 DIAGNOSIS — R63.1 POLYDIPSIA: ICD-10-CM

## 2020-02-10 PROCEDURE — 82951 GLUCOSE TOLERANCE TEST (GTT): CPT

## 2020-02-10 PROCEDURE — 36415 COLL VENOUS BLD VENIPUNCTURE: CPT | Mod: PN

## 2020-02-10 PROCEDURE — 82952 GTT-ADDED SAMPLES: CPT

## 2020-02-10 NOTE — TELEPHONE ENCOUNTER
Claudine, please figure out how to add a send out lab order for 24 hour urine glucose. I called the lab and they said to type in lab code 3072, select misc send out non-blood, then under comment write Ant. Please order this and send it to me to sign. Enrike, please assist Claudine. I know we've done this before but I con't figure out how to order it. Please connect this lab request to pts appt for 24 hour urine.

## 2020-02-11 ENCOUNTER — PATIENT MESSAGE (OUTPATIENT)
Dept: ENDOCRINOLOGY | Facility: CLINIC | Age: 30
End: 2020-02-11

## 2020-02-11 ENCOUNTER — TELEPHONE (OUTPATIENT)
Dept: FAMILY MEDICINE | Facility: CLINIC | Age: 30
End: 2020-02-11

## 2020-02-11 DIAGNOSIS — R63.1 POLYDIPSIA: Primary | ICD-10-CM

## 2020-02-11 DIAGNOSIS — R35.89 POLYURIA: ICD-10-CM

## 2020-02-11 DIAGNOSIS — R74.01 TRANSAMINITIS: Primary | ICD-10-CM

## 2020-02-11 LAB
GLUCOSE SERPL-MCNC: 130 MG/DL
GLUCOSE SERPL-MCNC: 144 MG/DL
GLUCOSE SERPL-MCNC: 66 MG/DL
GLUCOSE SERPL-MCNC: 90 MG/DL (ref 70–110)

## 2020-02-11 NOTE — TELEPHONE ENCOUNTER
Please call patient  Tell him that his liver numbers were elevated on recent labs done by endocrine.   I would like a repeat test sometime next week  Make sure to hold off tylenol and alcohol the day before.   Dr Porter .

## 2020-02-11 NOTE — TELEPHONE ENCOUNTER
Think I was able to create the order for the 24 hr urine glucose  Please message back when you have associated dx and we will link to a lab appt  Let us know if the order did not make it to you

## 2020-02-12 ENCOUNTER — TELEPHONE (OUTPATIENT)
Dept: ENDOCRINOLOGY | Facility: CLINIC | Age: 30
End: 2020-02-12

## 2020-02-12 NOTE — TELEPHONE ENCOUNTER
Left message #3 with pt. Gave information in detail per Dr. Sandifer's instructions about having blood work done the day he drops off 24 hr urine samples    Advised pt to contact us back to let us know he got message and if he had any questions or help moving forward

## 2020-02-12 NOTE — TELEPHONE ENCOUNTER
Thanks! Was able to sign the order. Can you make sure it gets linked in with the other 24 hour urine labs.

## 2020-02-12 NOTE — TELEPHONE ENCOUNTER
Unable to contact pt (left 3 different voicemails). Unable to schedule or link 24 hr urines to appt  Advised pt to call back in voicemail with detailed information per Dr. Sandifer's message

## 2020-02-13 ENCOUNTER — PATIENT MESSAGE (OUTPATIENT)
Dept: FAMILY MEDICINE | Facility: CLINIC | Age: 30
End: 2020-02-13

## 2020-02-14 ENCOUNTER — TELEPHONE (OUTPATIENT)
Dept: ENDOCRINOLOGY | Facility: CLINIC | Age: 30
End: 2020-02-14

## 2020-02-14 ENCOUNTER — LAB VISIT (OUTPATIENT)
Dept: LAB | Facility: HOSPITAL | Age: 30
End: 2020-02-14
Attending: INTERNAL MEDICINE
Payer: COMMERCIAL

## 2020-02-14 ENCOUNTER — OFFICE VISIT (OUTPATIENT)
Dept: FAMILY MEDICINE | Facility: CLINIC | Age: 30
End: 2020-02-14
Payer: COMMERCIAL

## 2020-02-14 VITALS
OXYGEN SATURATION: 98 % | HEIGHT: 70 IN | SYSTOLIC BLOOD PRESSURE: 140 MMHG | TEMPERATURE: 98 F | BODY MASS INDEX: 37.21 KG/M2 | WEIGHT: 259.94 LBS | HEART RATE: 97 BPM | DIASTOLIC BLOOD PRESSURE: 100 MMHG

## 2020-02-14 DIAGNOSIS — R03.0 ELEVATED BLOOD PRESSURE READING: ICD-10-CM

## 2020-02-14 DIAGNOSIS — H57.9 VISUAL COMPLAINT: Primary | ICD-10-CM

## 2020-02-14 DIAGNOSIS — R74.01 TRANSAMINITIS: ICD-10-CM

## 2020-02-14 DIAGNOSIS — R35.89 POLYURIA: ICD-10-CM

## 2020-02-14 DIAGNOSIS — R63.1 POLYDIPSIA: ICD-10-CM

## 2020-02-14 PROCEDURE — 99999 PR PBB SHADOW E&M-EST. PATIENT-LVL IV: ICD-10-PCS | Mod: PBBFAC,,, | Performed by: FAMILY MEDICINE

## 2020-02-14 PROCEDURE — 99214 PR OFFICE/OUTPT VISIT, EST, LEVL IV, 30-39 MIN: ICD-10-PCS | Mod: S$GLB,,, | Performed by: FAMILY MEDICINE

## 2020-02-14 PROCEDURE — 99999 PR PBB SHADOW E&M-EST. PATIENT-LVL IV: CPT | Mod: PBBFAC,,, | Performed by: FAMILY MEDICINE

## 2020-02-14 PROCEDURE — 30000890 MAYO MISCELLANEOUS TEST (REFLEX)

## 2020-02-14 PROCEDURE — 3008F BODY MASS INDEX DOCD: CPT | Mod: CPTII,S$GLB,, | Performed by: FAMILY MEDICINE

## 2020-02-14 PROCEDURE — 82945 GLUCOSE OTHER FLUID: CPT

## 2020-02-14 PROCEDURE — 3008F PR BODY MASS INDEX (BMI) DOCUMENTED: ICD-10-PCS | Mod: CPTII,S$GLB,, | Performed by: FAMILY MEDICINE

## 2020-02-14 PROCEDURE — 99214 OFFICE O/P EST MOD 30 MIN: CPT | Mod: S$GLB,,, | Performed by: FAMILY MEDICINE

## 2020-02-14 NOTE — TELEPHONE ENCOUNTER
----- Message from Cailin Lloyd sent at 2/14/2020  1:34 PM CST -----  Contact: pt  Type:  Patient Returning Call    Who Called: pt  Who Left Message for Patient:  Enrike  Does the patient know what this is regarding?:  Regarding test  Best Call Back Number:  [phone] (home)   Additional Information: na

## 2020-02-14 NOTE — PROGRESS NOTES
THIS DOCUMENT WAS MADE IN PART WITH VOICE RECOGNITION SOFTWARE.  OCCASIONALLY THIS SOFTWARE WILL MISINTERPRET WORDS OR PHRASES.    Assessment and Plan:    1. Visual complaint  Likely floaters, referral to Optometry for evaluation  - Ambulatory referral/consult to Optometry; Future    2. Transaminitis  Possibly due to statin drug, checked CMP, if still elevated discontinue Crestor and recheck again  - Comprehensive metabolic panel; Future    3. Elevated blood pressure reading  Patient will check blood pressure at home, decrease caffeine and salt intake and come back for 2 weeks for nursing visit.        ______________________________________________________________________  Subjective:    Chief Complaint:  Chief Complaint   Patient presents with    Eye Problem     Pt c/o seeing lines in his vision x6 months         HPI:  Michael is a 29 y.o. year old     Visual Complaint  Complains of seeing lines in vision x 6 months.   Denies any cornelio eye pain, trauma.  No prior history of I pathology.    Elevated blood pressure  No prior history of hypertension.  Reports drinking 2 cough he drinks this morning.  Denies any chest pain or shortness of breath.    Transaminitis  Noted on recent blood work by a different provider.  Recently started Crestor.  Denies any yellowing of skin, scleral icterus, itching.  No heavy drinking.      Past Medical History:  Past Medical History:   Diagnosis Date    Asthma     childhood    Depression     Hyperlipidemia     Obesity        Past Surgical History:  No past surgical history on file.    Family History:  Family History   Problem Relation Age of Onset    Hypoglycemic Mother     Hypertension Father     No Known Problems Brother     Stroke Maternal Grandmother     Heart attack Maternal Grandfather        Social History:  Social History     Socioeconomic History    Marital status: Single     Spouse name: Not on file    Number of children: Not on file    Years of education: Not on  file    Highest education level: Not on file   Occupational History    Not on file   Social Needs    Financial resource strain: Not on file    Food insecurity:     Worry: Not on file     Inability: Not on file    Transportation needs:     Medical: Not on file     Non-medical: Not on file   Tobacco Use    Smoking status: Never Smoker    Smokeless tobacco: Never Used   Substance and Sexual Activity    Alcohol use: Not Currently     Comment: heavy use for about 4-5 (1/2 pint per day)    Drug use: No    Sexual activity: Yes     Partners: Female   Lifestyle    Physical activity:     Days per week: Not on file     Minutes per session: Not on file    Stress: Not on file   Relationships    Social connections:     Talks on phone: Not on file     Gets together: Not on file     Attends Rastafari service: Not on file     Active member of club or organization: Not on file     Attends meetings of clubs or organizations: Not on file     Relationship status: Not on file   Other Topics Concern    Not on file   Social History Narrative    Not on file       Medications:  Current Outpatient Medications on File Prior to Visit   Medication Sig Dispense Refill    DULoxetine (CYMBALTA) 30 MG capsule TK ONE C PO  QAM      pantoprazole (PROTONIX) 40 MG tablet Take 1 tablet (40 mg total) by mouth once daily. 90 tablet 3    rosuvastatin (CRESTOR) 20 MG tablet Take 1 tablet (20 mg total) by mouth once daily. 90 tablet 3     No current facility-administered medications on file prior to visit.        Allergies:  Patient has no known allergies.    Immunizations:  Immunization History   Administered Date(s) Administered    DTaP 1988, 1988, 01/27/1989, 01/18/1990, 08/12/1993, 08/15/1997    HIB 01/02/1990, 12/16/1998    Hepatitis A, Adult 04/30/2009    Hepatitis A, Pediatric/Adolescent, 2 Dose 06/28/2006    Hepatitis B, Pediatric/Adolescent 09/17/1998, 10/08/1998, 12/16/1998, 10/20/1999    Influenza - Quadrivalent  "- PF (6 months and older) 12/16/2019    MMR 01/02/1990, 08/12/1993    Meningococcal Conjugate (MCV4P) 06/10/2005, 06/28/2006    OPV 1988, 1988, 01/18/1990, 08/12/1993    Tdap 06/28/2006, 12/16/2019    Varicella 09/17/1998, 12/16/1998       Review of Systems:  Review of Systems   Eyes:        Floater   All other systems reviewed and are negative.      Objective:    Vitals:  Vitals:    02/14/20 1353 02/14/20 1530   BP: (!) 136/98 (!) 140/100   Pulse: 97    Temp: 97.8 °F (36.6 °C)    TempSrc: Oral    SpO2: 98%    Weight: 117.9 kg (259 lb 14.8 oz)    Height: 5' 10" (1.778 m)    PainSc: 0-No pain        Physical Exam   Constitutional: No distress.   HENT:   Head: Normocephalic and atraumatic.   Eyes: Pupils are equal, round, and reactive to light. Conjunctivae, EOM and lids are normal. Lids are everted and swept, no foreign bodies found. Right eye exhibits no chemosis, no discharge, no exudate and no hordeolum. No foreign body present in the right eye. Left eye exhibits no chemosis, no discharge, no exudate and no hordeolum. No foreign body present in the left eye. Right conjunctiva is not injected. Right conjunctiva has no hemorrhage. Left conjunctiva is not injected. Left conjunctiva has no hemorrhage. No scleral icterus.   Neck: Neck supple.   Cardiovascular: Normal rate and regular rhythm. Exam reveals no friction rub.   No murmur heard.  Pulmonary/Chest: Effort normal and breath sounds normal.   Abdominal: Soft. Bowel sounds are normal. He exhibits no distension. There is no tenderness.   Skin: Skin is warm and dry. No rash noted.   Psychiatric: He has a normal mood and affect. His behavior is normal.       Data:  No previous labs, imaging, or notes available.        Kushal Porter MD  Family Medicine    "

## 2020-02-14 NOTE — TELEPHONE ENCOUNTER
Returned pt call but no answer  Left voicemail repeating Dr. Sandifer's message/instructions for f/u labs and 24 hr urines to complete  Advised to call back if needed or with questions

## 2020-02-17 ENCOUNTER — LAB VISIT (OUTPATIENT)
Dept: LAB | Facility: HOSPITAL | Age: 30
End: 2020-02-17
Attending: INTERNAL MEDICINE
Payer: COMMERCIAL

## 2020-02-17 DIAGNOSIS — R63.1 POLYDIPSIA: ICD-10-CM

## 2020-02-17 LAB
CHLORIDE 24H UR-SRATE: 12 MMOL/HR (ref 7–10)
CHLORIDE UR-SCNC: 61 MMOL/L (ref 25–200)
CHLORIDE, TIMED UR (MMOL/SPEC): 279 MMOL/SPEC
CREAT 24H UR-MRATE: 99.1 MG/HR (ref 40–75)
CREAT UR-MCNC: 52 MG/DL (ref 23–375)
CREATININE, URINE (MG/SPEC): 2379 MG/SPEC
OSMOLALITY UR: 282 MOSM/KG (ref 50–1200)
POTASSIUM 24H UR-SRATE: 3.4 MMOL/HR (ref 1–5)
POTASSIUM UR-SCNC: 18 MMOL/L (ref 15–95)
POTASSIUM URINE (MMOL/SPEC): 82.4 MMOL/SPEC
UREA NITROGEN URINE (MG/SPEC): ABNORMAL MG/SPEC
UREA NITROGEN,TIMED URINE: 461 MG/HR (ref 500–850)
URINE COLLECTION DURATION: 24 HR
URINE VOLUME: 4575 ML
UUN UR-MCNC: 242 MG/DL (ref 140–1050)

## 2020-02-17 PROCEDURE — 82570 ASSAY OF URINE CREATININE: CPT

## 2020-02-17 PROCEDURE — 83935 ASSAY OF URINE OSMOLALITY: CPT

## 2020-02-17 PROCEDURE — 84540 ASSAY OF URINE/UREA-N: CPT

## 2020-02-17 PROCEDURE — 84133 ASSAY OF URINE POTASSIUM: CPT

## 2020-02-17 PROCEDURE — 82436 ASSAY OF URINE CHLORIDE: CPT

## 2020-02-17 PROCEDURE — 84300 ASSAY OF URINE SODIUM: CPT

## 2020-02-18 LAB
SODIUM 24H UR-SRATE: 13.2 MMOL/HR (ref 2–9)
SODIUM UR-SCNC: 69 MMOL/L (ref 20–250)
SODIUM URINE (MMOL/SPEC): 316 MMOL/SPEC
URINE COLLECTION DURATION: 24 HR
URINE VOLUME: 4575 ML

## 2020-02-19 LAB — MAYO MISCELLANEOUS RESULT (REF): NORMAL

## 2020-02-28 ENCOUNTER — VITALS (OUTPATIENT)
Dept: FAMILY MEDICINE | Facility: CLINIC | Age: 30
End: 2020-02-28

## 2020-02-28 ENCOUNTER — CLINICAL SUPPORT (OUTPATIENT)
Dept: FAMILY MEDICINE | Facility: CLINIC | Age: 30
End: 2020-02-28
Payer: COMMERCIAL

## 2020-02-28 VITALS — SYSTOLIC BLOOD PRESSURE: 120 MMHG | DIASTOLIC BLOOD PRESSURE: 78 MMHG

## 2020-02-28 VITALS — DIASTOLIC BLOOD PRESSURE: 98 MMHG | HEART RATE: 77 BPM | SYSTOLIC BLOOD PRESSURE: 132 MMHG

## 2020-02-28 DIAGNOSIS — Z01.30 BP CHECK: Primary | ICD-10-CM

## 2020-02-28 PROCEDURE — 99499 UNLISTED E&M SERVICE: CPT | Mod: S$GLB,,, | Performed by: FAMILY MEDICINE

## 2020-02-28 PROCEDURE — 99499 NO LOS: ICD-10-PCS | Mod: S$GLB,,, | Performed by: FAMILY MEDICINE

## 2020-02-28 PROCEDURE — 99999 PR PBB SHADOW E&M-EST. PATIENT-LVL I: ICD-10-PCS | Mod: PBBFAC,,,

## 2020-02-28 PROCEDURE — 99999 PR PBB SHADOW E&M-EST. PATIENT-LVL I: CPT | Mod: PBBFAC,,,

## 2020-02-28 NOTE — PROGRESS NOTES
Initial reading at visit was 138/98 right arm, Pulse 80    Blood pressure reading after 15 minutes was 132/98 Pulse 77.  Dr. Porter notified.    Michael CRAFT Barba 29 y.o. male is here today for Blood Pressure check.   History of HTN no.    Review of patient's allergies indicates:  No Known Allergies  Creatinine   Date Value Ref Range Status   02/14/2020 1.0 0.5 - 1.4 mg/dL Final   02/14/2020 1.0 0.5 - 1.4 mg/dL Final     Sodium   Date Value Ref Range Status   02/14/2020 138 136 - 145 mmol/L Final   02/14/2020 138 136 - 145 mmol/L Final     Potassium   Date Value Ref Range Status   02/14/2020 3.7 3.5 - 5.1 mmol/L Final   02/14/2020 3.7 3.5 - 5.1 mmol/L Final   ]  Patient denies taking blood pressure medications on a regular basis at the same time of the day.     Current Outpatient Medications:     DULoxetine (CYMBALTA) 30 MG capsule, TK ONE C PO  QAM, Disp: , Rfl:     pantoprazole (PROTONIX) 40 MG tablet, Take 1 tablet (40 mg total) by mouth once daily., Disp: 90 tablet, Rfl: 3    rosuvastatin (CRESTOR) 20 MG tablet, Take 1 tablet (20 mg total) by mouth once daily., Disp: 90 tablet, Rfl: 3  Does patient have record of home blood pressure readings yes. Readings have been averaging 125/81.   Last dose of blood pressure medication was taken at N/A.  Patient is asymptomatic.   Complains of N/A.

## 2020-03-11 ENCOUNTER — OFFICE VISIT (OUTPATIENT)
Dept: OPTOMETRY | Facility: CLINIC | Age: 30
End: 2020-03-11
Payer: COMMERCIAL

## 2020-03-11 DIAGNOSIS — H57.9 VISUAL COMPLAINT: ICD-10-CM

## 2020-03-11 DIAGNOSIS — Z13.5 GLAUCOMA SCREENING: ICD-10-CM

## 2020-03-11 DIAGNOSIS — H43.393 VITREOUS FLOATERS, BILATERAL: Primary | ICD-10-CM

## 2020-03-11 DIAGNOSIS — H52.03 HYPEROPIA, BILATERAL: ICD-10-CM

## 2020-03-11 PROCEDURE — 99999 PR PBB SHADOW E&M-EST. PATIENT-LVL III: CPT | Mod: PBBFAC,,, | Performed by: OPTOMETRIST

## 2020-03-11 PROCEDURE — 92015 DETERMINE REFRACTIVE STATE: CPT | Mod: S$GLB,,, | Performed by: OPTOMETRIST

## 2020-03-11 PROCEDURE — 92004 COMPRE OPH EXAM NEW PT 1/>: CPT | Mod: S$GLB,,, | Performed by: OPTOMETRIST

## 2020-03-11 PROCEDURE — 92015 PR REFRACTION: ICD-10-PCS | Mod: S$GLB,,, | Performed by: OPTOMETRIST

## 2020-03-11 PROCEDURE — 99999 PR PBB SHADOW E&M-EST. PATIENT-LVL III: ICD-10-PCS | Mod: PBBFAC,,, | Performed by: OPTOMETRIST

## 2020-03-11 PROCEDURE — 92004 PR EYE EXAM, NEW PATIENT,COMPREHESV: ICD-10-PCS | Mod: S$GLB,,, | Performed by: OPTOMETRIST

## 2020-03-11 NOTE — PATIENT INSTRUCTIONS
"DRY EYES:  Use Over The Counter artificial tears as needed for dry eye symptoms.  Some common brands include:  Systane, Optive, and Refresh.  These drops can be used as frequently as desired, but may be most helpful use during long periods of concentrated work.  For example, reading / working at the computer.  Ophthalmic gel or ointments are available: Refresh PM, Genteal, and Lacrilube.  Avoid drops that "get redness out" (Visine, Murine, Clear Eyes), as these may contain medication that could further irritate the eyes.    ALLERGY EYES / ITCHING SYMPTOMS:  Over the counter medications include--Zaditor and Alaway  Use as directed 1-2 drops daily for symptoms of itching / watering eyes.  These drops will not help for dry eye or exposure symptoms.    REDNESS RELIEF:  Lumify---is a good redness reliever that will not cause chronic irritation.        FLASHES / FLOATERS / POSTERIOR VITREOUS DETACHMENT    Call the clinic if you have any further changes in symptoms.  Including:  Increased numbers of floaters or flashing lights, dimness or darkness that moves through or stays constant in your vision, or any pain in the eye (s).    You may sometimes see small specks or clouds moving in your field of vision.  They are called FLOATERS.  You can often see them when looking at a plain background, like a blank wall or blue [place].  Floaters are actually tiny clumps of gel or cells inside the VITREOUS, the clear jelly-like fluid that fills the inside of your eye.    While these objects look like they are in front of your eye, they are actually floating inside.  What you see are the shadows they cast on the RETINA, the nerve layer at the back of the eye that senses light and allows you to see.      POSTERIOR VITREOUS DETACHMENT    The appearance of new floaters may be alarming.  If you suddenly develop new floaters, you should contact your eye care professional  right away.    The retina can tear if the shrinking vitreous pulls away " from the wall of the eye.  This sometimes causes a small amount of bleeding in the eye that may appear as new floaters.    A torn retina is always a serious problem, since it can lead to a retinal detachment.  You should see your eye care professional as soon as possible if:     even one new floater appears suddenly;   you see sudden flashes of light;   you notice other symptoms, like the loss of side vision, or a curtain closes down in your vision        POSTERIOR VITREOUS DETACHMENT is more common for people who:     are nearsighted;   have had cataract surgery;   have had YAG laser surgery of the eye;   have had inflammation inside the eye;   are over age 60.      While some floaters may remain visible, many of them will fade over time and become less noticeable.  Even if you've had some floaters for years, you should have your eyes checked as soon as possible if you notice new ones.    FLASHING LIGHTS    When the vitreous gel rubs or pulls on the retina, you may see what look like flashing lights or lightning streaks.  These flashes can appear off and on for several weeks or months.      Some people experience flashes of light that appear as jagged lines or heat waves in both eyes, lasting 10-20 minutes.  These flashes are caused by a spasm of blood vessels in the brain, which is called a migraine.    If a headache follows these flashes, it's called a migraine headache.  If   no headache occurs, these flashes are called Ophthalmic or Ocular Migraine.

## 2020-03-11 NOTE — LETTER
March 11, 2020      Kushal Porter MD  [address]  [facility] [address]           [facility]  [address] [place] [address]  Phone: [phone]  Fax: [phone]          Patient: Michael Barba   MR Number: [id number]   YOB: 1990   Date of Visit: 3/11/2020       Dear Dr. Kushal Porter:    Thank you for referring Michael Barba to me for evaluation. Attached you will find relevant portions of my assessment and plan of care.    If you have questions, please do not hesitate to call me. I look forward to following Michael Barba along with you.    Sincerely,    JENNIFER Nassar, OD    Enclosure  CC:  No Recipients    If you would like to receive this communication electronically, please contact [email or web address] or [phone] to request more information on [facility] access.    For providers and/or their staff who would like to refer a patient to [facility], please contact us through our one-stop-shop provider referral line, [facility] , at [phone].    If you feel you have received this communication in error or would no longer like to receive these types of communications, please e-mail [email or web address]

## 2020-04-01 ENCOUNTER — PATIENT MESSAGE (OUTPATIENT)
Dept: ENDOCRINOLOGY | Facility: CLINIC | Age: 30
End: 2020-04-01

## 2020-04-01 DIAGNOSIS — R35.89 POLYURIA: Primary | ICD-10-CM

## 2020-04-01 NOTE — TELEPHONE ENCOUNTER
Please schedule patient lab work prior to next appointment. Please ask him to reframe from drinking water or other liquids from 7 pm the night before. Should do labs at 8 am.

## 2020-05-25 ENCOUNTER — TELEPHONE (OUTPATIENT)
Dept: ENDOCRINOLOGY | Facility: CLINIC | Age: 30
End: 2020-05-25

## 2020-05-25 NOTE — TELEPHONE ENCOUNTER
Attempted to contact pt. No answer. LVM to call clinic back. Called to offer pt virtual appt. Also noticed lab and urine specimens have not been completed yet (may have been postponed due to Covid-19), advised should complete them this week if able prior to Fridays appt. See Dr. Sandifer's last message about restricting water and 8am labs)

## 2020-07-08 DIAGNOSIS — R10.11 RUQ PAIN: ICD-10-CM

## 2020-07-08 RX ORDER — PANTOPRAZOLE SODIUM 40 MG/1
40 TABLET, DELAYED RELEASE ORAL DAILY
Qty: 90 TABLET | Refills: 3 | Status: CANCELLED | OUTPATIENT
Start: 2020-07-08 | End: 2021-07-08

## 2020-07-08 NOTE — TELEPHONE ENCOUNTER
According to our records Protonix was [facility] 12/[facility] #90x3. Sent [employer] message to pt to reach out to pharmacy to check refills on file.

## 2020-11-09 DIAGNOSIS — R10.11 RUQ PAIN: ICD-10-CM

## 2020-11-09 DIAGNOSIS — E78.5 DYSLIPIDEMIA: ICD-10-CM

## 2020-11-10 RX ORDER — PANTOPRAZOLE SODIUM 40 MG/1
40 TABLET, DELAYED RELEASE ORAL DAILY
Qty: 90 TABLET | Refills: 3 | Status: SHIPPED | OUTPATIENT
Start: 2020-11-10 | End: 2021-11-10

## 2020-11-10 RX ORDER — ROSUVASTATIN CALCIUM 20 MG/1
20 TABLET, COATED ORAL DAILY
Qty: 90 TABLET | Refills: 3 | Status: SHIPPED | OUTPATIENT
Start: 2020-11-10 | End: 2020-11-16 | Stop reason: SDUPTHER

## 2020-11-16 DIAGNOSIS — E78.5 DYSLIPIDEMIA: ICD-10-CM

## 2020-11-16 NOTE — TELEPHONE ENCOUNTER
Pt is requesting Rx to be resent to [facility], previous Rx was printed. Not sure where or if it was faxed but it has not be received

## 2020-11-16 NOTE — TELEPHONE ENCOUNTER
----- Message from Liz Mcgowanrodo sent at 11/16/2020  4:11 PM CST -----  Contact: self  Type:  RX Refill Request    Who Called:  patient  Refill or New Rx:  refill  RX Name and Strength:  rosuvastatin (CRESTOR) 20 MG tablet  How is the patient currently taking it? (ex. 1XDay):  1XDay  Is this a 30 day or 90 day RX:  90  Preferred Pharmacy with phone number:    [facility]   [address] [place] [address], [facility] [phone]  Local or Mail Order:  local  Ordering Provider:  Dr Charlotte Mario Call Back Number:  [phone] (home)   Additional Information:  Patient req this refill via [employer] and looking in his medicine list it shows it was printed.  Why please call patient back to advise and thanks

## 2020-11-17 RX ORDER — ROSUVASTATIN CALCIUM 20 MG/1
20 TABLET, COATED ORAL DAILY
Qty: 90 TABLET | Refills: 3 | Status: SHIPPED | OUTPATIENT
Start: 2020-11-17 | End: 2021-06-14 | Stop reason: SDUPTHER

## 2021-04-06 ENCOUNTER — PATIENT MESSAGE (OUTPATIENT)
Dept: ADMINISTRATIVE | Facility: HOSPITAL | Age: 31
End: 2021-04-06

## 2021-05-10 ENCOUNTER — PATIENT MESSAGE (OUTPATIENT)
Dept: RESEARCH | Facility: HOSPITAL | Age: 31
End: 2021-05-10

## 2021-06-14 DIAGNOSIS — R10.11 RUQ PAIN: ICD-10-CM

## 2021-06-14 DIAGNOSIS — E78.5 DYSLIPIDEMIA: ICD-10-CM

## 2021-06-14 RX ORDER — PANTOPRAZOLE SODIUM 40 MG/1
40 TABLET, DELAYED RELEASE ORAL DAILY
Qty: 90 TABLET | Refills: 3 | Status: CANCELLED | OUTPATIENT
Start: 2021-06-14 | End: 2022-06-14

## 2021-06-18 RX ORDER — ROSUVASTATIN CALCIUM 20 MG/1
20 TABLET, COATED ORAL DAILY
Qty: 90 TABLET | Refills: 0 | Status: SHIPPED | OUTPATIENT
Start: 2021-06-18 | End: 2021-07-07 | Stop reason: SDUPTHER

## 2021-07-07 ENCOUNTER — PATIENT MESSAGE (OUTPATIENT)
Dept: ADMINISTRATIVE | Facility: HOSPITAL | Age: 31
End: 2021-07-07

## 2021-07-07 DIAGNOSIS — E78.5 DYSLIPIDEMIA: ICD-10-CM

## 2021-07-08 RX ORDER — ROSUVASTATIN CALCIUM 20 MG/1
20 TABLET, COATED ORAL DAILY
Qty: 90 TABLET | Refills: 0 | Status: SHIPPED | OUTPATIENT
Start: 2021-07-08 | End: 2022-07-08

## 2022-02-28 ENCOUNTER — PATIENT MESSAGE (OUTPATIENT)
Dept: ADMINISTRATIVE | Facility: HOSPITAL | Age: 32
End: 2022-02-28
Payer: COMMERCIAL

## 2022-05-31 ENCOUNTER — PATIENT MESSAGE (OUTPATIENT)
Dept: ADMINISTRATIVE | Facility: HOSPITAL | Age: 32
End: 2022-05-31
Payer: COMMERCIAL